# Patient Record
Sex: FEMALE | Race: WHITE | NOT HISPANIC OR LATINO | Employment: OTHER | URBAN - NONMETROPOLITAN AREA
[De-identification: names, ages, dates, MRNs, and addresses within clinical notes are randomized per-mention and may not be internally consistent; named-entity substitution may affect disease eponyms.]

---

## 2019-04-08 ENCOUNTER — HOSPITAL ENCOUNTER (OUTPATIENT)
Dept: RESPIRATORY THERAPY | Facility: HOSPITAL | Age: 64
Discharge: HOME | End: 2019-04-08
Attending: FAMILY MEDICINE | Admitting: FAMILY MEDICINE
Payer: COMMERCIAL

## 2019-04-08 LAB
BASE EXCESS BLDA CALC-SCNC: 0.7 MMOL/L
COHGB MFR BLD: 0.6 % (ref 0–2)
HCO3 BLD-SCNC: 25 MMOL/L (ref 21–28)
HGB BLD-MCNC: 12.7 G/DL (ref 11.7–15.7)
O2/TOTAL GAS SETTING VFR VENT: NORMAL %
OXYHGB MFR BLD: 98 % (ref 92–100)
PCO2 BLD: 37 MM HG (ref 35–45)
PH BLD: 7.43 PH (ref 7.35–7.45)
PO2 BLD: 80 MM HG (ref 80–105)

## 2019-04-08 PROCEDURE — 94726 PLETHYSMOGRAPHY LUNG VOLUMES: CPT | Mod: 26 | Performed by: INTERNAL MEDICINE

## 2019-04-08 PROCEDURE — 94726 PLETHYSMOGRAPHY LUNG VOLUMES: CPT

## 2019-04-08 PROCEDURE — 94060 EVALUATION OF WHEEZING: CPT

## 2019-04-08 PROCEDURE — 36600 WITHDRAWAL OF ARTERIAL BLOOD: CPT

## 2019-04-08 PROCEDURE — 82375 ASSAY CARBOXYHB QUANT: CPT | Performed by: FAMILY MEDICINE

## 2019-04-08 PROCEDURE — 94729 DIFFUSING CAPACITY: CPT | Mod: 26 | Performed by: INTERNAL MEDICINE

## 2019-04-08 PROCEDURE — 25000125 ZZHC RX 250: Performed by: FAMILY MEDICINE

## 2019-04-08 PROCEDURE — 85018 HEMOGLOBIN: CPT | Performed by: FAMILY MEDICINE

## 2019-04-08 PROCEDURE — 94060 EVALUATION OF WHEEZING: CPT | Mod: 26 | Performed by: INTERNAL MEDICINE

## 2019-04-08 PROCEDURE — 82805 BLOOD GASES W/O2 SATURATION: CPT | Performed by: FAMILY MEDICINE

## 2019-04-08 PROCEDURE — 94729 DIFFUSING CAPACITY: CPT

## 2019-04-08 RX ORDER — ALBUTEROL SULFATE 0.83 MG/ML
2.5 SOLUTION RESPIRATORY (INHALATION)
Status: COMPLETED | OUTPATIENT
Start: 2019-04-08 | End: 2019-04-08

## 2019-04-08 RX ADMIN — ALBUTEROL SULFATE 2.5 MG: 2.5 SOLUTION RESPIRATORY (INHALATION) at 17:14

## 2020-10-03 ENCOUNTER — NURSE TRIAGE (OUTPATIENT)
Dept: NURSING | Facility: CLINIC | Age: 65
End: 2020-10-03

## 2020-10-03 NOTE — TELEPHONE ENCOUNTER
Patient's grand daughter is calling. Patient is also present. Reports her grandmother had surgery 2 weeks ago on her lower back for a bulging disc. Surgery was at [facility] in [place]. Patient was prescribed Oxycodone and Robaxin. Patient took Robaxin 3 hours ago. Caller wants to know if she can take Oxycodone. Patient is experiencing bad dull pain near thigh. Advised she can take Oxycodone as it was prescribed. Advised if pain still severe 1 hour after taking Oxycodone she should go in to be seen. Discussed she can also call [facility] where she had the surgery and speak with a on call surgeon for their recommendations due to the fact she is post operative. Caller verbalized understanding and had no further questions.     Christal Frazier RN/JO[facility] Nurse Advisors      Additional Information    Negative: Drug overdose and nurse unable to answer question    Negative: Caller requesting information not related to medicine    Negative: Caller requesting a prescription for Strep throat and has a positive culture result    Negative: Rash while taking a medication or within 3 days of stopping it    Negative: Immunization reaction suspected    Negative: [1] Asthma AND [2] having symptoms of asthma (cough, wheezing, etc)    Negative: MORE THAN A DOUBLE DOSE of a prescription or over-the-counter (OTC) drug    Negative: [1] DOUBLE DOSE (an extra dose or lesser amount) of over-the-counter (OTC) drug AND [2] any symptoms (e.g., dizziness, nausea, pain, sleepiness)    Negative: [1] DOUBLE DOSE (an extra dose or lesser amount) of prescription drug AND [2] any symptoms (e.g., dizziness, nausea, pain, sleepiness)    Negative: Took another person's prescription drug    Negative: [1] DOUBLE DOSE (an extra dose or lesser amount) of prescription drug AND [2] NO symptoms (Exception: a double dose of antibiotics)    Negative: Diabetes drug error or overdose (e.g., insulin or extra dose)    Negative: [1] Request for  "URGENT new prescription or refill of \"essential\" medication (i.e., likelihood of harm to patient if not taken) AND [2] triager unable to fill per unit policy    Negative: [1] Prescription not at pharmacy AND [2] was prescribed today by PCP    Negative: Pharmacy calling with prescription questions and triager unable to answer question    Negative: Caller has urgent medication question about med that PCP prescribed and triager unable to answer question    Negative: Caller has NON-URGENT medication question about med that PCP prescribed and triager unable to answer question    Negative: Caller requesting a NON-URGENT new prescription or refill and triager unable to refill per unit policy    Negative: Caller has medication question about med not prescribed by PCP and triager unable to answer question (e.g., compatibility with other med, storage)    Caller has medication question only, adult not sick, and triager answers question    Negative: [1] DOUBLE DOSE (an extra dose or lesser amount) of over-the-counter (OTC) drug AND [2] NO symptoms    Negative: [1] DOUBLE DOSE (an extra dose or lesser amount) of antibiotic drug AND [2] NO symptoms    Protocols used: MEDICATION QUESTION CALL-A-AH      "

## 2021-07-13 ENCOUNTER — MEDICAL CORRESPONDENCE (OUTPATIENT)
Dept: ULTRASOUND IMAGING | Facility: HOSPITAL | Age: 66
End: 2021-07-13

## 2021-08-06 ENCOUNTER — HOSPITAL ENCOUNTER (OUTPATIENT)
Dept: ULTRASOUND IMAGING | Facility: HOSPITAL | Age: 66
Discharge: HOME | End: 2021-08-06
Attending: FAMILY MEDICINE | Admitting: FAMILY MEDICINE
Payer: MEDICARE

## 2021-08-06 DIAGNOSIS — R60.0 LOWER EXTREMITY EDEMA: ICD-10-CM

## 2021-08-06 PROCEDURE — 93970 EXTREMITY STUDY: CPT

## 2021-12-29 ENCOUNTER — HOSPITAL ENCOUNTER (EMERGENCY)
Facility: HOSPITAL | Age: 66
Discharge: HOME | End: 2021-12-29
Admitting: NURSE PRACTITIONER
Payer: MEDICARE

## 2021-12-29 VITALS
OXYGEN SATURATION: 99 % | DIASTOLIC BLOOD PRESSURE: 80 MMHG | HEART RATE: 66 BPM | SYSTOLIC BLOOD PRESSURE: 155 MMHG | TEMPERATURE: 97.8 F | RESPIRATION RATE: 16 BRPM

## 2021-12-29 DIAGNOSIS — S61.221A LACERATION OF LEFT INDEX FINGER WITH FOREIGN BODY WITHOUT DAMAGE TO NAIL, INITIAL ENCOUNTER: ICD-10-CM

## 2021-12-29 PROCEDURE — 12001 RPR S/N/AX/GEN/TRNK 2.5CM/<: CPT | Performed by: NURSE PRACTITIONER

## 2021-12-29 PROCEDURE — 250N000011 HC RX IP 250 OP 636: Performed by: NURSE PRACTITIONER

## 2021-12-29 PROCEDURE — 90715 TDAP VACCINE 7 YRS/> IM: CPT | Performed by: NURSE PRACTITIONER

## 2021-12-29 PROCEDURE — 90471 IMMUNIZATION ADMIN: CPT | Performed by: NURSE PRACTITIONER

## 2021-12-29 PROCEDURE — 999N000104 HC STATISTIC NO CHARGE

## 2021-12-29 PROCEDURE — 12001 RPR S/N/AX/GEN/TRNK 2.5CM/<: CPT

## 2021-12-29 RX ADMIN — CLOSTRIDIUM TETANI TOXOID ANTIGEN (FORMALDEHYDE INACTIVATED), CORYNEBACTERIUM DIPHTHERIAE TOXOID ANTIGEN (FORMALDEHYDE INACTIVATED), BORDETELLA PERTUSSIS TOXOID ANTIGEN (GLUTARALDEHYDE INACTIVATED), BORDETELLA PERTUSSIS FILAMENTOUS HEMAGGLUTININ ANTIGEN (FORMALDEHYDE INACTIVATED), BORDETELLA PERTUSSIS PERTACTIN ANTIGEN, AND BORDETELLA PERTUSSIS FIMBRIAE 2/3 ANTIGEN 0.5 ML: 5; 2; 2.5; 5; 3; 5 INJECTION, SUSPENSION INTRAMUSCULAR at 12:10

## 2021-12-29 ASSESSMENT — ENCOUNTER SYMPTOMS
GASTROINTESTINAL NEGATIVE: 1
RESPIRATORY NEGATIVE: 1
MUSCULOSKELETAL NEGATIVE: 1
CONSTITUTIONAL NEGATIVE: 1
WOUND: 1
CARDIOVASCULAR NEGATIVE: 1
PSYCHIATRIC NEGATIVE: 1
NEUROLOGICAL NEGATIVE: 1
ENDOCRINE NEGATIVE: 1
HEMATOLOGIC/LYMPHATIC NEGATIVE: 1
ALLERGIC/IMMUNOLOGIC NEGATIVE: 1
EYES NEGATIVE: 1

## 2021-12-29 NOTE — DISCHARGE INSTRUCTIONS
Thank you for choosing [facility] for your healthcare needs today.  For your lacerated index finger, please keep it clean, dry, and covered.  Allow the glue to come off on its own, please try to keep the finger straight for 1 week, and watch for signs of infections as we spoke about in the room.  Use Tylenol or ibuprofen for discomfort.    Thank you

## 2021-12-29 NOTE — ED PROVIDER NOTES
History     Chief Complaint   Patient presents with     Laceration     lt pointer finger     HPI   Report given by patient     Marcelina Cuellar is a 66 year old female who presents for concern of a laceration to her left index finger that occurred yesterday.  She was using a new knife cutting ham and cut into her left index finger.  She was able to have the bleeding stopped but decided today she should have it looked at.  She has not sure as to her last tetanus vaccination.    Allergies:  No Known Allergies    Problem List:    There are no problems to display for this patient.       Past Medical History:    No past medical history on file.    Past Surgical History:    No past surgical history on file.    Family History:    No family history on file.    Social History:  Marital Status:   [2]  Social History     Tobacco Use     Smoking status: Not on file     Smokeless tobacco: Not on file   Substance Use Topics     Alcohol use: Not on file     Drug use: Not on file        Medications:    No current outpatient medications on file.        Review of Systems   Constitutional: Negative.    HENT: Negative.    Eyes: Negative.    Respiratory: Negative.    Cardiovascular: Negative.    Gastrointestinal: Negative.    Endocrine: Negative.    Genitourinary: Negative.    Musculoskeletal: Negative.    Skin: Positive for wound.   Allergic/Immunologic: Negative.    Neurological: Negative.    Hematological: Negative.    Psychiatric/Behavioral: Negative.        Physical Exam   BP: 155/80  Pulse: 66  Temp: 97.8  F (36.6  C)  Resp: 16  SpO2: 99 %      Physical Exam  Vitals and nursing note reviewed.   Constitutional:       Appearance: Normal appearance. She is normal weight.   HENT:      Head: Normocephalic and atraumatic.      Nose: Nose normal.      Mouth/Throat:      Mouth: Mucous membranes are moist.      Pharynx: Oropharynx is clear.   Eyes:      Extraocular Movements: Extraocular movements intact.      Conjunctiva/sclera:  Conjunctivae normal.      Pupils: Pupils are equal, round, and reactive to light.   Cardiovascular:      Rate and Rhythm: Normal rate.      Pulses: Normal pulses.      Heart sounds: Normal heart sounds.   Pulmonary:      Effort: Pulmonary effort is normal.      Breath sounds: Normal breath sounds.   Abdominal:      General: Abdomen is flat. Bowel sounds are normal.      Palpations: Abdomen is soft.   Musculoskeletal:         General: Normal range of motion.      Cervical back: Normal range of motion.   Skin:     General: Skin is warm and dry.      Findings: No rash.   Neurological:      General: No focal deficit present.      Mental Status: She is alert and oriented to person, place, and time.   Psychiatric:         Mood and Affect: Mood normal.         Behavior: Behavior normal.         Thought Content: Thought content normal.         Judgment: Judgment normal.         ED Course     - Wound was soaked in Hibiclens water  - Dermabond glue applied  -Bandage applied and wound wrapped            Critical Care time:  13 min           No results found for this or any previous visit (from the past 24 hour(s)).    Medications   Tdap (tetanus-diphtheria-acell pertussis) (ADACEL) injection 0.5 mL (0.5 mLs Intramuscular Given 12/29/21 1210)   topical skin adhesive (SECURESEAL) strip 1 applicator (1 applicator Topical Given 12/29/21 1227)       Assessments & Plan (with Medical Decision Making)   Marcelina is a 66-year-old female who presents with concerns for a superficial laceration to the left index finger.  This occurred yesterday.  She did wash it really well when this occurred.  There is no bleeding present.  The laceration itself is superficial about 2 cm long, not involving the nail bed.  I was able to Derma bond and applied a dressing.  Patient received tetanus vaccination as she is outdated.  Instructions given for laceration care.  Instructions given to watch for signs of infections.    I have reviewed the nursing  notes.    I have reviewed the findings, diagnosis, plan and need for follow up with the patient.    There are no discharge medications for this patient.      Final diagnoses:   Laceration of left index finger with foreign body without damage to nail, initial encounter       12/29/2021   [facility]     Emmy Glass APRN CNP  12/29/21 1238

## 2021-12-29 NOTE — ED TRIAGE NOTES
Patient presents to urgent care for a cut on her LT pointer finger yesterday. Patient was cutting a ham with new knives she received for [employer]. Patient wants to make sure she don't need stitches. Pain is 4/10.  Patient due for her tetanus.

## 2021-12-29 NOTE — ED TRIAGE NOTES
Cut finger yesterday and continues to bleed a fair amount.  Would like to have it checked to see if it needs sutures.

## 2022-03-14 ENCOUNTER — MEDICAL CORRESPONDENCE (OUTPATIENT)
Dept: HEALTH INFORMATION MANAGEMENT | Facility: HOSPITAL | Age: 67
End: 2022-03-14
Payer: MEDICARE

## 2022-03-28 ENCOUNTER — HOSPITAL ENCOUNTER (OUTPATIENT)
Dept: INTERVENTIONAL RADIOLOGY/VASCULAR | Facility: HOSPITAL | Age: 67
Discharge: HOME | End: 2022-03-28
Attending: PHYSICAL MEDICINE & REHABILITATION
Payer: MEDICARE

## 2022-03-28 ENCOUNTER — HOSPITAL ENCOUNTER (OUTPATIENT)
Facility: HOSPITAL | Age: 67
Discharge: HOME | End: 2022-03-28
Attending: RADIOLOGY | Admitting: RADIOLOGY
Payer: MEDICARE

## 2022-03-28 DIAGNOSIS — M51.14 INTERVERTEBRAL DISC DISORDERS WITH RADICULOPATHY, THORACIC REGION: ICD-10-CM

## 2022-03-28 PROCEDURE — 250N000011 HC RX IP 250 OP 636: Performed by: RADIOLOGY

## 2022-03-28 PROCEDURE — 62321 NJX INTERLAMINAR CRV/THRC: CPT

## 2022-03-28 RX ORDER — IOPAMIDOL 612 MG/ML
15 INJECTION, SOLUTION INTRATHECAL ONCE
Status: COMPLETED | OUTPATIENT
Start: 2022-03-28 | End: 2022-03-28

## 2022-03-28 RX ORDER — METHYLPREDNISOLONE ACETATE 80 MG/ML
80 INJECTION, SUSPENSION INTRA-ARTICULAR; INTRALESIONAL; INTRAMUSCULAR; SOFT TISSUE ONCE
Status: COMPLETED | OUTPATIENT
Start: 2022-03-28 | End: 2022-03-28

## 2022-03-28 RX ADMIN — METHYLPREDNISOLONE ACETATE 80 MG: 80 INJECTION, SUSPENSION INTRA-ARTICULAR; INTRALESIONAL; INTRAMUSCULAR; SOFT TISSUE at 10:23

## 2022-03-28 RX ADMIN — IOPAMIDOL 2 ML: 612 INJECTION, SOLUTION INTRATHECAL at 10:23

## 2022-03-28 NOTE — DISCHARGE INSTRUCTIONS
Home number on file [phone] (home)  Is it ok to leave a message at this number(s)? Yes    Dr. Galvan completed your procedure on 3/28/2022.    Current Pain Level (0-10 Scale): 2/10  Post Pain Level (0-10):  0/10    Radiology Discharge instructions for Steroid Injection    Activity Level:     Do not do any heavy activity or exercise for 24 hours.   Do not drive for 4 hours after your injection.  Diet:   Return to your normal diet.  Medications:   If you have stopped taking your Aspirin, Coumadin/Warfarin, Ibuprofen, or any   other blood thinner for this procedure you may resume in the morning unless   your primary care provider has given you other instructions.    Diabetics may see an increase in blood sugar after steroid injections. If you are concerned about your blood sugar, please contact your family doctor.    Site Care:  Remove the bandage and bathe or shower the morning after the procedure.      Please allow two weeks to experience improvement in your pain.  If you have any further issues, please contact your provider.    Call your Primary Care Provider if you have the following (if your primary care provider is not available please seek emergency care):   Nausea with vomiting   Severe headache   Drowsiness or confusion   Redness or drainage at the injection or puncture site   Temperature over 101 degrees F   Other concerns   Worsening back pain   Stiff neck

## 2022-04-20 ENCOUNTER — TELEPHONE (OUTPATIENT)
Dept: INTERVENTIONAL RADIOLOGY/VASCULAR | Facility: HOSPITAL | Age: 67
End: 2022-04-20
Payer: MEDICARE

## 2022-04-20 NOTE — TELEPHONE ENCOUNTER
INJECTION POST CALL    Procedure: Epidural TL T7-8  Radiologist(s): Dr. Sonido Galvan  Date of Procedure:  3/28/22    Relief of pain from this injection    A = 90%  A- = 85%  B = 80%  B- =75%  C = 70%  C- = 65%  D = 60%  D- = 50%  F = less than 50%    Do you feel this injection was beneficial?Yes   If yes, how long did it last? 80% relief for one week    Where is the pain located?  Middle of back on the spine  Can you describe the pain? Sore and achey    Does the pain radiate anywhere? no  If yes, where does the pain stop?n/a    Is this new pain? No      I responded to the patient's questions/concerns.    Instruct patient to follow up with their provider for any further care they may need. (as Dr. Galvan will no longer be making recommendations)    Patient scheduled for a series of 3 injections.   Elza Nunez

## 2022-05-10 ENCOUNTER — HOSPITAL ENCOUNTER (OUTPATIENT)
Facility: HOSPITAL | Age: 67
Discharge: HOME | End: 2022-05-10
Attending: RADIOLOGY | Admitting: RADIOLOGY
Payer: MEDICARE

## 2022-05-10 ENCOUNTER — HOSPITAL ENCOUNTER (OUTPATIENT)
Dept: INTERVENTIONAL RADIOLOGY/VASCULAR | Facility: HOSPITAL | Age: 67
Discharge: HOME | End: 2022-05-10
Attending: PHYSICAL MEDICINE & REHABILITATION
Payer: MEDICARE

## 2022-05-10 DIAGNOSIS — M51.14 INTERVERTEBRAL DISC DISORDERS WITH RADICULOPATHY, THORACIC REGION: ICD-10-CM

## 2022-05-10 PROCEDURE — 62321 NJX INTERLAMINAR CRV/THRC: CPT

## 2022-05-10 PROCEDURE — 250N000011 HC RX IP 250 OP 636: Performed by: RADIOLOGY

## 2022-05-10 RX ORDER — DEXAMETHASONE SODIUM PHOSPHATE 10 MG/ML
10 INJECTION, SOLUTION INTRAMUSCULAR; INTRAVENOUS ONCE
Status: COMPLETED | OUTPATIENT
Start: 2022-05-10 | End: 2022-05-10

## 2022-05-10 RX ORDER — IOPAMIDOL 612 MG/ML
15 INJECTION, SOLUTION INTRATHECAL ONCE
Status: COMPLETED | OUTPATIENT
Start: 2022-05-10 | End: 2022-05-10

## 2022-05-10 RX ADMIN — DEXAMETHASONE SODIUM PHOSPHATE 10 MG: 10 INJECTION, SOLUTION INTRAMUSCULAR; INTRAVENOUS at 08:26

## 2022-05-10 RX ADMIN — IOPAMIDOL 4 ML: 612 INJECTION, SOLUTION INTRATHECAL at 08:26

## 2022-05-10 NOTE — DISCHARGE INSTRUCTIONS
Cell number on file:    Telephone Information:   Mobile [phone]     Is it ok to leave a message at this number(s)? Yes    Dr. Galvan completed your procedure on 5/10/2022.    Current Pain Level (0-10 Scale): 5/10  Post Pain Level (0-10):  0/10    Radiology Discharge instructions for Steroid Injection    Activity Level:     Do not do any heavy activity or exercise for 24 hours.   Do not drive for 4 hours after your injection.  Diet:   Return to your normal diet.  Medications:   If you have stopped taking your Aspirin, Coumadin/Warfarin, Ibuprofen, or any   other blood thinner for this procedure you may resume in the morning unless   your primary care provider has given you other instructions.    Diabetics may see an increase in blood sugar after steroid injections. If you are concerned about your blood sugar, please contact your family doctor.    Site Care:  Remove the bandage and bathe or shower the morning after the procedure.      Please allow two weeks to experience improvement in your pain.  If you have any further issues, please contact your provider.    Call your Primary Care Provider if you have the following (if your primary care provider is not available please seek emergency care):   Nausea with vomiting   Severe headache   Drowsiness or confusion   Redness or drainage at the injection or puncture site   Temperature over 101 degrees F   Other concerns   Worsening back pain   Stiff neck

## 2022-05-18 ENCOUNTER — DOCUMENTATION ONLY (OUTPATIENT)
Dept: INTERVENTIONAL RADIOLOGY/VASCULAR | Facility: HOSPITAL | Age: 67
End: 2022-05-18

## 2022-05-18 RX ORDER — LIDOCAINE 50 MG/G
1 OINTMENT TOPICAL
COMMUNITY

## 2022-05-18 RX ORDER — METOPROLOL SUCCINATE 50 MG/1
50 TABLET, EXTENDED RELEASE ORAL DAILY
COMMUNITY
Start: 2022-03-05

## 2022-05-18 RX ORDER — UPADACITINIB 15 MG/1
15 TABLET, EXTENDED RELEASE ORAL DAILY
COMMUNITY
Start: 2022-05-12

## 2022-05-18 RX ORDER — LEFLUNOMIDE 20 MG/1
20 TABLET ORAL DAILY
COMMUNITY
Start: 2021-11-29 | End: 2023-07-28

## 2022-05-18 RX ORDER — ROSUVASTATIN CALCIUM 10 MG/1
20 TABLET, COATED ORAL DAILY
COMMUNITY
Start: 2021-08-11

## 2022-05-18 RX ORDER — FOLIC ACID 1 MG/1
1 TABLET ORAL DAILY
COMMUNITY

## 2022-05-18 RX ORDER — GABAPENTIN 100 MG/1
100 CAPSULE ORAL 2 TIMES DAILY
COMMUNITY
Start: 2021-10-19

## 2022-05-18 RX ORDER — LIDOCAINE 50 MG/G
1 OINTMENT TOPICAL 4 TIMES DAILY PRN
COMMUNITY
Start: 2022-05-09

## 2022-05-25 ENCOUNTER — TELEPHONE (OUTPATIENT)
Dept: INTERVENTIONAL RADIOLOGY/VASCULAR | Facility: HOSPITAL | Age: 67
End: 2022-05-25
Payer: MEDICARE

## 2022-05-25 NOTE — TELEPHONE ENCOUNTER
INJECTION POST CALL    Procedure: Epidural TL T7-8  Radiologist(s): Dr. Sonido Galvan  Date of Procedure:  5/10/22    Relief of pain from this injection    A = 90%  A- = 85%  B = 80%  B- =75%  C = 70%  C- = 65%  D = 60%  D- = 50%  F = less than 50%    Do you feel this injection was beneficial? Yes   If yes, how long did it last? Currently experiencing 60% relief, Can get worse at times if laying on back or sitting too long.    Where is the pain located?  Middle back between shoulder blades   Can you describe the pain? Dull ache    Does the pain radiate anywhere? no  If yes, where does the pain stop?n/a    Is this new pain? Yes      The patient had no questions or concerns. Patient is scheduled for 3 rd injection.    Instruct patient to follow up with their provider for any further care they may need. (as Dr. Galvan will no longer be making recommendations)    lEza Nunez

## 2022-05-31 ENCOUNTER — HOSPITAL ENCOUNTER (OUTPATIENT)
Dept: INTERVENTIONAL RADIOLOGY/VASCULAR | Facility: HOSPITAL | Age: 67
Discharge: HOME | End: 2022-05-31
Attending: PHYSICAL MEDICINE & REHABILITATION | Admitting: RADIOLOGY
Payer: MEDICARE

## 2022-05-31 ENCOUNTER — HOSPITAL ENCOUNTER (OUTPATIENT)
Facility: HOSPITAL | Age: 67
Discharge: HOME | End: 2022-05-31
Attending: RADIOLOGY | Admitting: RADIOLOGY
Payer: MEDICARE

## 2022-05-31 DIAGNOSIS — M51.14 INTERVERTEBRAL DISC DISORDERS WITH RADICULOPATHY, THORACIC REGION: ICD-10-CM

## 2022-05-31 PROCEDURE — 250N000011 HC RX IP 250 OP 636: Performed by: RADIOLOGY

## 2022-05-31 PROCEDURE — 62321 NJX INTERLAMINAR CRV/THRC: CPT

## 2022-05-31 RX ORDER — IOPAMIDOL 612 MG/ML
15 INJECTION, SOLUTION INTRATHECAL ONCE
Status: COMPLETED | OUTPATIENT
Start: 2022-05-31 | End: 2022-05-31

## 2022-05-31 RX ORDER — DEXAMETHASONE SODIUM PHOSPHATE 10 MG/ML
10 INJECTION, SOLUTION INTRAMUSCULAR; INTRAVENOUS ONCE
Status: COMPLETED | OUTPATIENT
Start: 2022-05-31 | End: 2022-05-31

## 2022-05-31 RX ADMIN — IOPAMIDOL 6 ML: 612 INJECTION, SOLUTION INTRATHECAL at 14:10

## 2022-05-31 RX ADMIN — DEXAMETHASONE SODIUM PHOSPHATE 10 MG: 10 INJECTION, SOLUTION INTRAMUSCULAR; INTRAVENOUS at 14:11

## 2022-05-31 NOTE — DISCHARGE INSTRUCTIONS
Home number on file [phone] (home)  Is it ok to leave a message at this number(s)? Yes    Dr. Galvan completed your procedure on 5/31/2022.    Current Pain Level (0-10 Scale): 4/10  Post Pain Level (0-10):  2/10    Radiology Discharge instructions for Steroid Injection    Activity Level:     Do not do any heavy activity or exercise for 24 hours.   Do not drive for 4 hours after your injection.  Diet:   Return to your normal diet.  Medications:   If you have stopped taking your Aspirin, Coumadin/Warfarin, Ibuprofen, or any   other blood thinner for this procedure you may resume in the morning unless   your primary care provider has given you other instructions.    Diabetics may see an increase in blood sugar after steroid injections. If you are concerned about your blood sugar, please contact your family doctor.    Site Care:  Remove the bandage and bathe or shower the morning after the procedure.      Please allow two weeks to experience improvement in your pain.  If you have any further issues, please contact your provider.    Call your Primary Care Provider if you have the following (if your primary care provider is not available please seek emergency care):   Nausea with vomiting   Severe headache   Drowsiness or confusion   Redness or drainage at the injection or puncture site   Temperature over 101 degrees F   Other concerns   Worsening back pain   Stiff neck

## 2022-06-24 ENCOUNTER — TELEPHONE (OUTPATIENT)
Dept: GENERAL RADIOLOGY | Facility: HOSPITAL | Age: 67
End: 2022-06-24

## 2022-06-24 NOTE — TELEPHONE ENCOUNTER
Patient called 6/23 and 6/24 for 2 week follow up PIETER thoracic injection.  No answer and no return phone call.  Post card sent.

## 2022-07-11 ENCOUNTER — TELEPHONE (OUTPATIENT)
Dept: INTERVENTIONAL RADIOLOGY/VASCULAR | Facility: HOSPITAL | Age: 67
End: 2022-07-11

## 2022-07-19 ENCOUNTER — MEDICAL CORRESPONDENCE (OUTPATIENT)
Dept: HEALTH INFORMATION MANAGEMENT | Facility: HOSPITAL | Age: 67
End: 2022-07-19

## 2022-08-01 ENCOUNTER — OFFICE VISIT (OUTPATIENT)
Dept: CHIROPRACTIC MEDICINE | Facility: OTHER | Age: 67
End: 2022-08-01
Attending: CHIROPRACTOR
Payer: MEDICARE

## 2022-08-01 DIAGNOSIS — M99.02 SEGMENTAL AND SOMATIC DYSFUNCTION OF THORACIC REGION: ICD-10-CM

## 2022-08-01 DIAGNOSIS — M99.01 SEGMENTAL AND SOMATIC DYSFUNCTION OF CERVICAL REGION: Primary | ICD-10-CM

## 2022-08-01 DIAGNOSIS — M54.2 CERVICALGIA: ICD-10-CM

## 2022-08-01 PROCEDURE — 98940 CHIROPRACT MANJ 1-2 REGIONS: CPT | Mod: AT | Performed by: CHIROPRACTOR

## 2022-08-09 ENCOUNTER — OFFICE VISIT (OUTPATIENT)
Dept: CHIROPRACTIC MEDICINE | Facility: OTHER | Age: 67
End: 2022-08-09
Attending: CHIROPRACTOR
Payer: MEDICARE

## 2022-08-09 DIAGNOSIS — M54.2 CERVICALGIA: ICD-10-CM

## 2022-08-09 DIAGNOSIS — M99.01 SEGMENTAL AND SOMATIC DYSFUNCTION OF CERVICAL REGION: Primary | ICD-10-CM

## 2022-08-09 DIAGNOSIS — M99.02 SEGMENTAL AND SOMATIC DYSFUNCTION OF THORACIC REGION: ICD-10-CM

## 2022-08-09 PROCEDURE — 98940 CHIROPRACT MANJ 1-2 REGIONS: CPT | Mod: AT | Performed by: CHIROPRACTOR

## 2022-08-11 NOTE — PROGRESS NOTES
Subjective Finding:    Chief compalint: Patient presents with:  Neck Pain: With ear ache  , Pain Scale: 6/10, Intensity: sharp, Duration: 1 months, Radiating: .    Date of injury:     Activities that the pain restricts:   Home/household/hobbies/social activities: yes.  Work duties: no  Sleep: yes.  Makes symptoms better: rest.  Makes symptoms worse: cervical extension and cervical flexion.  Have you seen anyone else for the symptoms? Yes: MD.  Work related: no.  Automobile related injury: no.    Objective and Assessment:    Posture Analysis:   High shoulder: .  Head tilt: .  High iliac crest: .  Head carriage: forward.  Thoracic Kyphosis: neutral.  Lumbar Lordosis: neutral.    Lumbar Range of Motion: .  Cervical Range of Motion: extension decreased.  Thoracic Range of Motion: .  Extremity Range of Motion: .    Palpation:   Sub-occiput: sharp pain and stiff, referred pain: no    Segmental dysfunction pre-treatment and treatment area: C1, C4, C5 and T2.    Assessment post-treatment:  Cervical: ROM increased.  Thoracic: ROM increased.  Lumbar: .    Comments: .      Complicating Factors: .    Procedure(s):  CMT:  48170 Chiropractic manipulative treatment 1-2 regions performed   Cervical: Diversified, See above for level, Supine and Thoracic: Diversified, See above for level, Prone    Modalities:  None performed this visit    Therapeutic procedures:  None    Plan:  Treatment plan: 2 times per week for 2 weeks.  Instructed patient: ice 20 minutes every other hour as needed and stretch as instructed at visit.  Short term goals: increase ROM.  Long term goals: increase ADL.  Prognosis: excellent.

## 2022-08-15 ENCOUNTER — HOSPITAL ENCOUNTER (OUTPATIENT)
Dept: BONE DENSITY | Facility: HOSPITAL | Age: 67
Discharge: HOME | End: 2022-08-15
Attending: FAMILY MEDICINE | Admitting: FAMILY MEDICINE
Payer: MEDICARE

## 2022-08-15 DIAGNOSIS — M81.0 SENILE OSTEOPOROSIS: ICD-10-CM

## 2022-08-15 PROCEDURE — 77080 DXA BONE DENSITY AXIAL: CPT

## 2022-08-23 NOTE — PROGRESS NOTES
Subjective Finding:    Chief compalint: Patient presents with:  Neck Pain  , Pain Scale: 6/10, Intensity: sharp, Duration: 1 months, Radiating: .    Date of injury:     Activities that the pain restricts:   Home/household/hobbies/social activities: yes.  Work duties: no  Sleep: yes.  Makes symptoms better: rest.  Makes symptoms worse: cervical extension and cervical flexion.  Have you seen anyone else for the symptoms? Yes: MD.  Work related: no.  Automobile related injury: no.    Objective and Assessment:    Posture Analysis:   High shoulder: .  Head tilt: .  High iliac crest: .  Head carriage: forward.  Thoracic Kyphosis: neutral.  Lumbar Lordosis: neutral.    Lumbar Range of Motion: .  Cervical Range of Motion: extension decreased.  Thoracic Range of Motion: .  Extremity Range of Motion: .    Palpation:   Sub-occiput: sharp pain and stiff, referred pain: no    Segmental dysfunction pre-treatment and treatment area: C1, C4, C5 and T2.    Assessment post-treatment:  Cervical: ROM increased.  Thoracic: ROM increased.  Lumbar: .    Comments: .      Complicating Factors: .    Procedure(s):  CMT:  92861 Chiropractic manipulative treatment 1-2 regions performed   Cervical: Diversified, See above for level, Supine and Thoracic: Diversified, See above for level, Prone    Modalities:  None performed this visit    Therapeutic procedures:  None    Plan:  Treatment plan: 2 times per week for 2 weeks.  Instructed patient: ice 20 minutes every other hour as needed and stretch as instructed at visit.  Short term goals: increase ROM.  Long term goals: increase ADL.  Prognosis: excellent.

## 2023-01-17 ENCOUNTER — OFFICE VISIT (OUTPATIENT)
Dept: OTOLARYNGOLOGY | Facility: OTHER | Age: 68
End: 2023-01-17
Attending: OTOLARYNGOLOGY
Payer: COMMERCIAL

## 2023-01-17 DIAGNOSIS — H93.19 TINNITUS DUE TO MYOKYMIA OF MIDDLE EAR MUSCULATURE: Primary | ICD-10-CM

## 2023-01-17 PROCEDURE — G0463 HOSPITAL OUTPT CLINIC VISIT: HCPCS

## 2023-01-23 NOTE — PROGRESS NOTES
document embedded image  Patient Name: Marcelina Cuellar    Address: [address] [place] [address]     YOB: 1955    [place] [id number]    MR Number: [id number]    Phone: [phone]  PCP: Briseyda Petit MD            Appointment Date: 01/17/23   Visit Provider: Jeremy Estrella MD    cc: Briseyda Petit MD; ~    ENT Progress Note  Intake  Visit Reasons: Check Tubes/per Dr Catherine    HPI  History of Present Illness  Chief complaint:  Intermittent rhythmic tinnitus    History  The patient is a 67-year-old who is had long-term eustachian tube symptoms, she did have tubes placed by Dr. Catherine.  She presents today because she is been bothered by a persistent intermittent rhythmic tinnitus of her right ear.  It does not correlate with her heartbeat.  She admits she is been under a great deal of stress of late.  She is had no previous otologic surgery.    Exam  The external auditory canals and TMs are clear.  The tubes are in place and patent.  Head and neck integument is clear  Nasal-no obstruction or purulence  Oral cavity oropharynx-free of lesions or inflammation  Neck-no masses or adenopathy  General-the patient appears well and in no distress  Neuro-there are no focal cranial nerve deficits    Allergies    atorvastatin [From Lipitor] Adverse Reaction (Mild, Verified 11/28/22 08:06)  leg pain  gabapentin Adverse Reaction (Mild, Verified 11/28/22 08:06)  headaches  methotrexate Adverse Reaction (Mild, Verified 11/28/22 08:06)  elevated lfts    PFSH  PFSH:   Past Medical History: (Reviewed 11/28/22 @ 08:06 by Rosangela Juarez, Med Assist)    Acne rosacea  Bilateral primary osteoarthritis of knee (11/15/18)  COVID-19  july 2022  Depression with anxiety (05/08/18)  Elevated LDL cholesterol level (09/11/18)  Essential hypertension (09/11/18)  Family history of colon cancer in mother  Family history of colonic polyps  Hip pain, left  History of colon polyps  Lateral meniscus tear  Nonischemic cardiomyopathy  (18)  Left ventricular systolic function is mildly reduced.  Ejection Fraction = 4050%.  The calculated modified Horton's ejection fraction is 48%.  There is mild global hypokinesis of the left ventricle.  The right ventricular systolic function is normal.  Primary osteoarthritis involving multiple joints  Rheumatoid arthritis of multiple sites without rheumatoid factor  Right leg DVT  -pregnancy due to bedrest/cerclage  Senile osteopenia   dexa. t score spine -1.9  Seropositive rheumatoid arthritis  Failed methotrexate, [place]  Thoracic scoliosis  Venous insufficiency   venous competency    Past Surgical History: (Reviewed 22 @ 08:06 by Rosangela Juarez, Med Assist)    Anesthesia  no history of reaction. no postop nausea or vomiting  History of ankle surgery  BROKE LEFT ANKLE   History of cholecystectomy    History of knee surgery  RIGHT KNEE ARTHOSCOPY ,   History of placement of ear tubes  -chronic ear effusion  History of total hysterectomy    S/P tubal ligation    Family History Notes: G6PD deficient     Family History: (Reviewed 22 @ 08:06 by Rosangela Juarez, Med Assist)  Father   ,  74 yrs  Myocardial infarction       in 40s  Afib  Mother   ,  62 yrs  Colon cancer  Grandmother - Maternal   ,   MI  Myocardial infarction  Coronary artery disease  Son  Pancreatitis  Sister  Brain bleed  Daughter     No problems noted.    Brother  Hyperlipidemia  Brother     No problems noted.    Brother     No problems noted.    Brother     No problems noted.    Sister     No problems noted.    Sister     No problems noted.       Social History: (Reviewed 22 @ 08:06 by Rosangela Juarez, Med Assist)  Smoking Status:  Never smoker  second hand exposure:  No  alcohol intake:  never  substance use type:  does not use  marital status:    number of children:  2  housing:  house  current occupational status:  retired  current occupation:   /  previous occupational history:  /  Additional Social History:  Weight Management: Do you feel like your weight is affecting your health? 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  We have no proven therapies.  She could talk to her primary doctor regarding a benzodiazepine short-term if her symptoms persist.  I typically would not recommend surgical intervention for this condition.      Jeremy Estrella MD    01/17/23 5233    <Electronically signed by Jeremy Estrella MD> 01/18/23 6616

## 2023-06-20 DIAGNOSIS — R06.02 SOB (SHORTNESS OF BREATH): Primary | ICD-10-CM

## 2023-07-28 ENCOUNTER — HOSPITAL ENCOUNTER (EMERGENCY)
Facility: HOSPITAL | Age: 68
Discharge: HOME | End: 2023-07-28
Attending: NURSE PRACTITIONER | Admitting: NURSE PRACTITIONER
Payer: COMMERCIAL

## 2023-07-28 ENCOUNTER — APPOINTMENT (OUTPATIENT)
Dept: GENERAL RADIOLOGY | Facility: HOSPITAL | Age: 68
End: 2023-07-28
Attending: NURSE PRACTITIONER
Payer: COMMERCIAL

## 2023-07-28 VITALS
SYSTOLIC BLOOD PRESSURE: 174 MMHG | OXYGEN SATURATION: 96 % | DIASTOLIC BLOOD PRESSURE: 79 MMHG | RESPIRATION RATE: 16 BRPM | TEMPERATURE: 97.6 F | HEART RATE: 68 BPM

## 2023-07-28 DIAGNOSIS — R06.02 SHORTNESS OF BREATH: ICD-10-CM

## 2023-07-28 LAB
ALBUMIN SERPL BCG-MCNC: 3.9 G/DL (ref 3.5–5.2)
ALP SERPL-CCNC: 83 U/L (ref 35–104)
ALT SERPL W P-5'-P-CCNC: 12 U/L (ref 0–50)
ANION GAP SERPL CALCULATED.3IONS-SCNC: 12 MMOL/L (ref 7–15)
AST SERPL W P-5'-P-CCNC: 29 U/L (ref 0–45)
BASOPHILS # BLD AUTO: 0 10E3/UL (ref 0–0.2)
BASOPHILS NFR BLD AUTO: 1 %
BILIRUB SERPL-MCNC: 0.3 MG/DL
BUN SERPL-MCNC: 13.4 MG/DL (ref 8–23)
CALCIUM SERPL-MCNC: 9.2 MG/DL (ref 8.8–10.2)
CHLORIDE SERPL-SCNC: 103 MMOL/L (ref 98–107)
CREAT SERPL-MCNC: 1.03 MG/DL (ref 0.51–0.95)
DEPRECATED HCO3 PLAS-SCNC: 24 MMOL/L (ref 22–29)
EOSINOPHIL # BLD AUTO: 0.2 10E3/UL (ref 0–0.7)
EOSINOPHIL NFR BLD AUTO: 2 %
ERYTHROCYTE [DISTWIDTH] IN BLOOD BY AUTOMATED COUNT: 14.1 % (ref 10–15)
GFR SERPL CREATININE-BSD FRML MDRD: 59 ML/MIN/1.73M2
GLUCOSE SERPL-MCNC: 99 MG/DL (ref 70–99)
HCT VFR BLD AUTO: 36.6 % (ref 35–47)
HGB BLD-MCNC: 11.7 G/DL (ref 11.7–15.7)
HOLD SPECIMEN: NORMAL
IMM GRANULOCYTES # BLD: 0.1 10E3/UL
IMM GRANULOCYTES NFR BLD: 1 %
LYMPHOCYTES # BLD AUTO: 1.9 10E3/UL (ref 0.8–5.3)
LYMPHOCYTES NFR BLD AUTO: 28 %
MCH RBC QN AUTO: 29.5 PG (ref 26.5–33)
MCHC RBC AUTO-ENTMCNC: 32 G/DL (ref 31.5–36.5)
MCV RBC AUTO: 92 FL (ref 78–100)
MONOCYTES # BLD AUTO: 0.8 10E3/UL (ref 0–1.3)
MONOCYTES NFR BLD AUTO: 12 %
NEUTROPHILS # BLD AUTO: 3.7 10E3/UL (ref 1.6–8.3)
NEUTROPHILS NFR BLD AUTO: 56 %
NRBC # BLD AUTO: 0 10E3/UL
NRBC BLD AUTO-RTO: 0 /100
NT-PROBNP SERPL-MCNC: 151 PG/ML (ref 0–900)
PLATELET # BLD AUTO: 193 10E3/UL (ref 150–450)
POTASSIUM SERPL-SCNC: 3.8 MMOL/L (ref 3.4–5.3)
PROT SERPL-MCNC: 6.6 G/DL (ref 6.4–8.3)
RBC # BLD AUTO: 3.97 10E6/UL (ref 3.8–5.2)
SODIUM SERPL-SCNC: 139 MMOL/L (ref 136–145)
WBC # BLD AUTO: 6.6 10E3/UL (ref 4–11)

## 2023-07-28 PROCEDURE — 999N000157 HC STATISTIC RCP TIME EA 10 MIN

## 2023-07-28 PROCEDURE — 83880 ASSAY OF NATRIURETIC PEPTIDE: CPT | Performed by: NURSE PRACTITIONER

## 2023-07-28 PROCEDURE — 85025 COMPLETE CBC W/AUTO DIFF WBC: CPT | Performed by: NURSE PRACTITIONER

## 2023-07-28 PROCEDURE — 71046 X-RAY EXAM CHEST 2 VIEWS: CPT

## 2023-07-28 PROCEDURE — 99214 OFFICE O/P EST MOD 30 MIN: CPT | Performed by: NURSE PRACTITIONER

## 2023-07-28 PROCEDURE — 36415 COLL VENOUS BLD VENIPUNCTURE: CPT | Performed by: NURSE PRACTITIONER

## 2023-07-28 PROCEDURE — 94640 AIRWAY INHALATION TREATMENT: CPT

## 2023-07-28 PROCEDURE — G0463 HOSPITAL OUTPT CLINIC VISIT: HCPCS | Mod: 25

## 2023-07-28 PROCEDURE — 250N000009 HC RX 250: Performed by: NURSE PRACTITIONER

## 2023-07-28 PROCEDURE — 80053 COMPREHEN METABOLIC PANEL: CPT | Performed by: NURSE PRACTITIONER

## 2023-07-28 RX ORDER — PREDNISONE 20 MG/1
TABLET ORAL
Qty: 10 TABLET | Refills: 0 | Status: SHIPPED | OUTPATIENT
Start: 2023-07-28

## 2023-07-28 RX ORDER — IPRATROPIUM BROMIDE AND ALBUTEROL SULFATE 2.5; .5 MG/3ML; MG/3ML
3 SOLUTION RESPIRATORY (INHALATION) ONCE
Status: COMPLETED | OUTPATIENT
Start: 2023-07-28 | End: 2023-07-28

## 2023-07-28 RX ORDER — LEVOTHYROXINE SODIUM 50 UG/1
1 TABLET ORAL
COMMUNITY
Start: 2022-09-19

## 2023-07-28 RX ORDER — ALBUTEROL SULFATE 90 UG/1
2 AEROSOL, METERED RESPIRATORY (INHALATION) EVERY 4 HOURS PRN
Qty: 18 G | Refills: 0 | Status: SHIPPED | OUTPATIENT
Start: 2023-07-28

## 2023-07-28 RX ORDER — BENZONATATE 200 MG/1
200 CAPSULE ORAL 3 TIMES DAILY PRN
COMMUNITY
Start: 2023-07-26

## 2023-07-28 RX ORDER — INHALER, ASSIST DEVICES
SPACER (EA) MISCELLANEOUS
Qty: 1 EACH | Refills: 0 | Status: SHIPPED | OUTPATIENT
Start: 2023-07-28

## 2023-07-28 RX ORDER — CEFPROZIL 500 MG/1
1 TABLET, FILM COATED ORAL
COMMUNITY
Start: 2023-07-24

## 2023-07-28 RX ADMIN — IPRATROPIUM BROMIDE AND ALBUTEROL SULFATE 3 ML: .5; 3 SOLUTION RESPIRATORY (INHALATION) at 16:54

## 2023-07-28 ASSESSMENT — ENCOUNTER SYMPTOMS
VOMITING: 0
SHORTNESS OF BREATH: 1
SINUS PRESSURE: 0
FATIGUE: 1
TROUBLE SWALLOWING: 0
HEADACHES: 0
CHILLS: 0
SORE THROAT: 0
RHINORRHEA: 1
DIZZINESS: 1
FEVER: 0
EYES NEGATIVE: 1
ACTIVITY CHANGE: 1
NAUSEA: 0
VOICE CHANGE: 1
COUGH: 1
SINUS PAIN: 0
MYALGIAS: 1

## 2023-07-28 ASSESSMENT — ACTIVITIES OF DAILY LIVING (ADL): ADLS_ACUITY_SCORE: 35

## 2023-07-28 NOTE — DISCHARGE INSTRUCTIONS
Loratadine (Claritin) or cetirizine (Zyrtec) 20 mg  daily for ten to fourteen days to see if symptoms lessen or resolve. If the medication seems to help you may take 10 mg daily on an ongoing basis.  May buy over the counter.    May use Tessalon Perles as prescribed for cough    Complete currently prescribed antibiotics  -Increase fluids.   -Complete all antibiotics even if feeling better.    -Taking antibiotics with food may decrease the symptoms, of an upset stomach, that can occur when taking antibiotics. Antibiotics frequently cause diarrhea. Probiotics or yogurt may help prevent or decrease these symptoms.   -Return to be reevaluated if symptoms do not improve, or worsen.

## 2023-07-28 NOTE — ED TRIAGE NOTES
Reports cough x4 weeks. 1 round of ABX completed and is on a different ABX that started on Monday. Reports she is also having some ear discomfort. Advised to come back in if she wasn't starting to feel better.

## 2023-07-28 NOTE — ED TRIAGE NOTES
Ongoing productive (unsure of color) cough for 4 weeks, on 2nd round of abx (started Monday) has RA, last sept had e tubes, has been having lots of pressure in L ear. Neg Respiratory panel 7/24 at PCPs office. Neg TB test (6+ months ago).   Denies fevers, gi sx, gu sx.    Jessa Behrman, YAAN

## 2023-07-28 NOTE — ED PROVIDER NOTES
History     Chief Complaint   Patient presents with    Cough     HPI  Marcelina Cuellar is a 68 year old female who presents with a 4.5-week history of fatigue, ear pain, runny nose, voice change, cough, shortness of breath, increased body aches, and dizziness.  Was treated with a Z-Sebastian approximately 3 weeks ago; did not get better.  Negative COVID, RSV, influenza test at that time.  PCP started her on cefprozil 4 days ago and told her to come in if she did not improve.  No known sick contacts.  History of rheumatoid arthritis.  Non-smoker.  Denies fevers, chills, nausea, vomiting, chest pain, and headache.      Allergies:  Allergies   Allergen Reactions    Atorvastatin Other (See Comments)     Leg pain   Leg pain      Gabapentin     Methotrexate        Problem List:    There are no problems to display for this patient.       Past Medical History:    History reviewed. No pertinent past medical history.    Past Surgical History:    History reviewed. No pertinent surgical history.    Family History:    History reviewed. No pertinent family history.    Social History:  Marital Status:   [2]        Medications:    albuterol (PROAIR HFA/PROVENTIL HFA/VENTOLIN HFA) 108 (90 Base) MCG/ACT inhaler  benzonatate (TESSALON) 200 MG capsule  cefPROZIL (CEFZIL) 500 MG tablet  diclofenac (VOLTAREN) 1 % topical gel  folic acid (FOLVITE) 1 MG tablet  gabapentin (NEURONTIN) 100 MG capsule  levothyroxine (SYNTHROID/LEVOTHROID) 50 MCG tablet  lidocaine (XYLOCAINE) 5 % external ointment  lidocaine (XYLOCAINE) 5 % external ointment  metoprolol succinate ER (TOPROL XL) 50 MG 24 hr tablet  predniSONE (DELTASONE) 20 MG tablet  RINVOQ 15 MG TB24  rosuvastatin (CRESTOR) 10 MG tablet  spacer (OPTICHAMBER MABEL) holding chamber          Review of Systems   Constitutional:  Positive for activity change and fatigue. Negative for chills and fever.   HENT:  Positive for ear pain, rhinorrhea and voice change. Negative for sinus pressure,  sinus pain, sore throat and trouble swallowing.    Eyes: Negative.    Respiratory:  Positive for cough and shortness of breath.    Gastrointestinal:  Negative for nausea and vomiting.   Genitourinary: Negative.    Musculoskeletal:  Positive for myalgias.   Skin: Negative.    Neurological:  Positive for dizziness. Negative for headaches.       Physical Exam   BP: 174/79  Pulse: 68  Temp: 97.6  F (36.4  C)  Resp: 16  SpO2: 96 %      Physical Exam  Vitals and nursing note reviewed.   Constitutional:       General: She is not in acute distress.  HENT:      Head: Normocephalic.      Right Ear: Tympanic membrane and ear canal normal. A PE tube is present.      Left Ear: Tympanic membrane and ear canal normal. No PE tube.      Nose: Nose normal.      Right Turbinates: Swollen.      Left Turbinates: Swollen.      Right Sinus: No maxillary sinus tenderness or frontal sinus tenderness.      Left Sinus: No maxillary sinus tenderness or frontal sinus tenderness.      Mouth/Throat:      Lips: Pink.      Mouth: Mucous membranes are moist.      Pharynx: Uvula midline. No posterior oropharyngeal erythema.      Comments: small amount of translucent post nasal drip noted posterior oropharynx      Eyes:      Conjunctiva/sclera: Conjunctivae normal.   Cardiovascular:      Rate and Rhythm: Normal rate and regular rhythm.      Heart sounds: Normal heart sounds. No murmur heard.  Pulmonary:      Effort: Pulmonary effort is normal. No respiratory distress.      Breath sounds: Normal air entry. Examination of the right-upper field reveals wheezing. Examination of the right-lower field reveals wheezing and rhonchi. Examination of the left-lower field reveals wheezing. Wheezing and rhonchi (fine) present.   Lymphadenopathy:      Cervical: No cervical adenopathy.   Skin:     General: Skin is warm and dry.   Neurological:      Mental Status: She is alert and oriented to person, place, and time.   Psychiatric:         Behavior: Behavior normal.          ED Course                 Procedures             Results for orders placed or performed during the hospital encounter of 07/28/23 (from the past 24 hour(s))   Chest XR,  PA & LAT    Narrative    Exam:  XR CHEST 2 VIEWS    HISTORY: wheezes. rhonchi RLL, cough.    COMPARISON:  None.    FINDINGS:     The cardiomediastinal contours are normal.      No focal consolidation, effusion, or pneumothorax.      No acute osseous abnormality.       Impression    IMPRESSION:      No acute cardiopulmonary process.      CALIXTO SAMSON MD         SYSTEM ID:  [id number]   CBC with platelets differential    Narrative    The following orders were created for panel order CBC with platelets differential.  Procedure                               Abnormality         Status                     ---------                               -----------         ------                     CBC with platelets and d...[[id number]]                      Final result                 Please view results for these tests on the individual orders.   NT pro BNP   Result Value Ref Range    N terminal Pro BNP Inpatient 151 0 - 900 pg/mL   Comprehensive metabolic panel   Result Value Ref Range    Sodium 139 136 - 145 mmol/L    Potassium 3.8 3.4 - 5.3 mmol/L    Chloride 103 98 - 107 mmol/L    Carbon Dioxide (CO2) 24 22 - 29 mmol/L    Anion Gap 12 7 - 15 mmol/L    Urea Nitrogen 13.4 8.0 - 23.0 mg/dL    Creatinine 1.03 (H) 0.51 - 0.95 mg/dL    Calcium 9.2 8.8 - 10.2 mg/dL    Glucose 99 70 - 99 mg/dL    Alkaline Phosphatase 83 35 - 104 U/L    AST 29 0 - 45 U/L    ALT 12 0 - 50 U/L    Protein Total 6.6 6.4 - 8.3 g/dL    Albumin 3.9 3.5 - 5.2 g/dL    Bilirubin Total 0.3 <=1.2 mg/dL    GFR Estimate 59 (L) >60 mL/min/1.73m2   CBC with platelets and differential   Result Value Ref Range    WBC Count 6.6 4.0 - 11.0 10e3/uL    RBC Count 3.97 3.80 - 5.20 10e6/uL    Hemoglobin 11.7 11.7 - 15.7 g/dL    Hematocrit 36.6 35.0 - 47.0 %    MCV 92 78 - 100 fL    MCH 29.5 26.5  - 33.0 pg    MCHC 32.0 31.5 - 36.5 g/dL    RDW 14.1 10.0 - 15.0 %    Platelet Count 193 150 - 450 10e3/uL    % Neutrophils 56 %    % Lymphocytes 28 %    % Monocytes 12 %    % Eosinophils 2 %    % Basophils 1 %    % Immature Granulocytes 1 %    NRBCs per 100 WBC 0 <1 /100    Absolute Neutrophils 3.7 1.6 - 8.3 10e3/uL    Absolute Lymphocytes 1.9 0.8 - 5.3 10e3/uL    Absolute Monocytes 0.8 0.0 - 1.3 10e3/uL    Absolute Eosinophils 0.2 0.0 - 0.7 10e3/uL    Absolute Basophils 0.0 0.0 - 0.2 10e3/uL    Absolute Immature Granulocytes 0.1 <=0.4 10e3/uL    Absolute NRBCs 0.0 10e3/uL   Extra Tube    Narrative    The following orders were created for panel order Extra Tube.  Procedure                               Abnormality         Status                     ---------                               -----------         ------                     Extra Blue Top Tube[[id number]]                              In process                 Extra Red Top Tube[[id number]]                               In process                 Extra Heparinized Syringe[[id number]]                        In process                   Please view results for these tests on the individual orders.       Medications   ipratropium - albuterol 0.5 mg/2.5 mg/3 mL (DUONEB) neb solution 3 mL (3 mLs Nebulization $Given 7/28/23 9515)       Assessments & Plan (with Medical Decision Making)     I have reviewed the nursing notes.    I have reviewed the findings, diagnosis, plan and need for follow up with the patient.  (R06.02) Shortness of breath  Comment:68 year old female who presents with a 4.5-week history of fatigue, ear pain, runny nose, voice change, cough, shortness of breath, increased body aches, and dizziness.  Was treated with a Z-Sebastian approximately 3 weeks ago; did not get better.  Negative COVID, RSV, influenza test at that time.  PCP started her on cefprozil 4 days ago and told her to come in if she did not improve.  No known sick contacts.  History of  rheumatoid arthritis.  Non-smoker.  Denies fevers, chills, nausea, vomiting, chest pain, and headache.    MDM: NHT wheezes noted LLL, and right lung fields. Fine rhonchi noted LLL    CBC, BNP, and CMP negative with the exception of  creatinine of 1.03 and GFR < 59    CXR reviewed and per radiology:No acute cardiopulmonary process.     Duo neb did decrease the sensation of shortness of breath slightly    Plan: albuterol inhaler and prednisone burst. Cetirizine. Education provided and/or discussed for this/these medications and shortness of breath.  Loratadine (Claritin) or cetirizine (Zyrtec) 20 mg  daily for ten to fourteen days to see if symptoms lessen or resolve. If the medication seems to help you may take 10 mg daily on an ongoing basis.  May buy over the counter.    May use Tessalon Perles as prescribed for cough    Complete currently prescribed antibiotics  -Increase fluids.   -Complete all antibiotics even if feeling better.    -Taking antibiotics with food may decrease the symptoms, of an upset stomach, that can occur when taking antibiotics. Antibiotics frequently cause diarrhea. Probiotics or yogurt may help prevent or decrease these symptoms.   -Return to be reevaluated if symptoms do not improve, or worsen.    These discharge instructions and medications were reviewed with her and her  and understanding verbalized.    This document was prepared using a combination of typing and voice generated software.  While every attempt was made for accuracy, spelling and grammatical errors may exist.    New Prescriptions    ALBUTEROL (PROAIR HFA/PROVENTIL HFA/VENTOLIN HFA) 108 (90 BASE) MCG/ACT INHALER    Inhale 2 puffs into the lungs every 4 hours as needed for shortness of breath or wheezing    PREDNISONE (DELTASONE) 20 MG TABLET    Take two tablets (= 40mg) each day for 5 (five) days    SPACER (OPTICHAMBER MABEL) HOLDING CHAMBER    Use with inhaler       Final diagnoses:   Shortness of breath        7/28/2023   [facility]         Meenu Gusman, CNP  07/28/23 9356

## 2023-08-22 ENCOUNTER — HOSPITAL ENCOUNTER (OUTPATIENT)
Dept: RESPIRATORY THERAPY | Facility: HOSPITAL | Age: 68
Discharge: HOME | End: 2023-08-22
Attending: FAMILY MEDICINE | Admitting: INTERNAL MEDICINE
Payer: COMMERCIAL

## 2023-08-22 DIAGNOSIS — R06.02 SOB (SHORTNESS OF BREATH): Primary | ICD-10-CM

## 2023-08-22 PROCEDURE — 94726 PLETHYSMOGRAPHY LUNG VOLUMES: CPT | Mod: 26 | Performed by: INTERNAL MEDICINE

## 2023-08-22 PROCEDURE — 94729 DIFFUSING CAPACITY: CPT | Mod: 26 | Performed by: INTERNAL MEDICINE

## 2023-08-22 PROCEDURE — 94726 PLETHYSMOGRAPHY LUNG VOLUMES: CPT

## 2023-08-22 PROCEDURE — 94010 BREATHING CAPACITY TEST: CPT

## 2023-08-22 PROCEDURE — 94729 DIFFUSING CAPACITY: CPT

## 2023-08-22 PROCEDURE — 94010 BREATHING CAPACITY TEST: CPT | Mod: 26 | Performed by: INTERNAL MEDICINE

## 2023-11-25 ENCOUNTER — HOSPITAL ENCOUNTER (EMERGENCY)
Facility: HOSPITAL | Age: 68
Discharge: HOME | End: 2023-11-25
Attending: NURSE PRACTITIONER | Admitting: NURSE PRACTITIONER
Payer: COMMERCIAL

## 2023-11-25 ENCOUNTER — APPOINTMENT (OUTPATIENT)
Dept: GENERAL RADIOLOGY | Facility: HOSPITAL | Age: 68
End: 2023-11-25
Attending: NURSE PRACTITIONER
Payer: COMMERCIAL

## 2023-11-25 VITALS
BODY MASS INDEX: 26.48 KG/M2 | HEIGHT: 70 IN | WEIGHT: 185 LBS | OXYGEN SATURATION: 98 % | HEART RATE: 78 BPM | TEMPERATURE: 98 F | SYSTOLIC BLOOD PRESSURE: 168 MMHG | RESPIRATION RATE: 21 BRPM | DIASTOLIC BLOOD PRESSURE: 92 MMHG

## 2023-11-25 DIAGNOSIS — S99.921A FOOT TRAUMA, RIGHT, INITIAL ENCOUNTER: Primary | ICD-10-CM

## 2023-11-25 PROCEDURE — G0463 HOSPITAL OUTPT CLINIC VISIT: HCPCS

## 2023-11-25 PROCEDURE — 99213 OFFICE O/P EST LOW 20 MIN: CPT | Performed by: NURSE PRACTITIONER

## 2023-11-25 PROCEDURE — 73630 X-RAY EXAM OF FOOT: CPT | Mod: RT

## 2023-11-25 ASSESSMENT — ENCOUNTER SYMPTOMS
ARTHRALGIAS: 1
WOUND: 0
COLOR CHANGE: 1
FEVER: 0
JOINT SWELLING: 1
NUMBNESS: 0
CHILLS: 0

## 2023-11-25 ASSESSMENT — ACTIVITIES OF DAILY LIVING (ADL): ADLS_ACUITY_SCORE: 33

## 2023-11-25 NOTE — ED PROVIDER NOTES
"  History     Chief Complaint   Patient presents with    Foot Pain     HPI  Marcelina Cuellar is a 68 year old female who presents for evaluation of right foot injury.    Joint Pain  Onset: Thanksgiving morning (2 days ago)  Description:   Location: Right foot  Character: dull, aching  Intensity: Currently at rest 5/10. Pain worsens with wearing a shoe/pressure, walking.   Progression of Symptoms: Symptoms have improved but persists which brings her in today  Accompanying Signs & Symptoms:  Other symptoms: \"blood blister,\" bruising,   History:   Previous similar pain: no     Precipitating factors:   Trauma or overuse: YES- dropped a large plastic container of sugar (she estimates about 3-4 pounds of sugar in the container) on her right foot.   Alleviating factors:  Improved by: as below    Therapies Tried and outcome: ice, elevation, advil- helpful     She does have a history of rheumatoid arthritis and currently on Rinvoq for management. Was recently on steroids for flare-up of symptoms in her left shoulder. No currently taking prednisone.         Allergies:  Allergies   Allergen Reactions    Atorvastatin Other (See Comments)     Leg pain   Leg pain      Gabapentin     Methotrexate        Problem List:    There are no problems to display for this patient.       Past Medical History:    No past medical history on file.    Past Surgical History:    No past surgical history on file.    Family History:    No family history on file.    Social History:  Marital Status:   [2]        Medications:    albuterol (PROAIR HFA/PROVENTIL HFA/VENTOLIN HFA) 108 (90 Base) MCG/ACT inhaler  benzonatate (TESSALON) 200 MG capsule  cefPROZIL (CEFZIL) 500 MG tablet  diclofenac (VOLTAREN) 1 % topical gel  folic acid (FOLVITE) 1 MG tablet  gabapentin (NEURONTIN) 100 MG capsule  levothyroxine (SYNTHROID/LEVOTHROID) 50 MCG tablet  lidocaine (XYLOCAINE) 5 % external ointment  lidocaine (XYLOCAINE) 5 % external ointment  metoprolol " "succinate ER (TOPROL XL) 50 MG 24 hr tablet  predniSONE (DELTASONE) 20 MG tablet  RINVOQ 15 MG TB24  rosuvastatin (CRESTOR) 10 MG tablet  spacer (OPTICHAMBER MABEL) holding chamber          Review of Systems   Constitutional:  Negative for chills and fever.   Musculoskeletal:  Positive for arthralgias and joint swelling.   Skin:  Positive for color change. Negative for wound.   Neurological:  Negative for numbness.       Physical Exam   BP: 168/92  Pulse: 78  Temp: 98  F (36.7  C)  Resp: 21  Height: 177.8 cm (5' 10\")  Weight: 83.9 kg (185 lb)  SpO2: 98 %      Physical Exam  Constitutional:       General: She is not in acute distress.     Appearance: Normal appearance. She is not ill-appearing or toxic-appearing.   Cardiovascular:      Pulses:           Dorsalis pedis pulses are 2+ on the right side.        Posterior tibial pulses are 2+ on the right side.   Musculoskeletal:        Feet:    Skin:     General: Skin is warm and dry.      Capillary Refill: Capillary refill takes less than 2 seconds.   Neurological:      Mental Status: She is alert and oriented to person, place, and time.      Gait: Gait is intact.   Psychiatric:         Speech: Speech normal.         Behavior: Behavior is cooperative.         ED Course                 Procedures        Results for orders placed or performed during the hospital encounter of 11/25/23 (from the past 24 hour(s))   Foot  XR, G/E 3 views, right    Narrative    PROCEDURE:  XR FOOT RIGHT G/E 3 VIEWS    HISTORY: trauma, bruising, swelling, h/o rheumatoid arthritis and  steroid use.    COMPARISON:  None.    TECHNIQUE:  3 views right foot.    FINDINGS:  No fracture or dislocation is identified. The joint spaces  are preserved. No foreign body is seen. Diffuse forefoot swelling is  seen.      Impression    IMPRESSION: Diffuse forefoot swelling without definite fracture.  Consider follow-up.      JEFRY DELEON MD         SYSTEM ID:  [id number]       Medications - No data to " display    Assessments & Plan (with Medical Decision Making)     I have reviewed the nursing notes.    I have reviewed the findings, diagnosis, plan and need for follow up with the patient.  (R13.569E) Foot trauma, right, initial encounter  (primary encounter diagnosis)  Comment: acute, symptomatic  Plan: XR negative for acute fracture  - Continue with rest, elevation, elevation, elevation  - Ice and/or heat for discomfort  - Manage pain with acetaminophen (Tylenol) 1,000 mg every 8 hours and ibuprofen (Advil) 800 mg with food every 8 hours. *You can alternate these every 4 hours. For example: 8 am ibuprofen, 12 pm acetaminophen, 4 pm ibuprofen, 8 pm acetaminophen, etc.*    RETURN TO THE ED WITH NEW OR WORSENING SYMPTOMS.    FOLLOW-UP WITH YOUR PRIMARY CARE PROVIDER IN 5-7 DAYS IF NO IMPROVEMENT.      Adilene Marie CNP    New Prescriptions    No medications on file       Final diagnoses:   Foot trauma, right, initial encounter       11/25/2023   [facility]       Adilene Marie CNP  11/25/23 3163

## 2023-11-25 NOTE — DISCHARGE INSTRUCTIONS
(K72.290Q) Foot trauma, right, initial encounter  (primary encounter diagnosis)  Comment: acute, symptomatic  Plan: XR negative for acute fracture  - Continue with rest, elevation, elevation, elevation  - Ice and/or heat for discomfort  - Manage pain with acetaminophen (Tylenol) 1,000 mg every 8 hours and ibuprofen (Advil) 800 mg with food every 8 hours. *You can alternate these every 4 hours. For example: 8 am ibuprofen, 12 pm acetaminophen, 4 pm ibuprofen, 8 pm acetaminophen, etc.*    RETURN TO THE ED WITH NEW OR WORSENING SYMPTOMS.    FOLLOW-UP WITH YOUR PRIMARY CARE PROVIDER IN 5-7 DAYS IF NO IMPROVEMENT.      Adilene Marie CNP      Results for orders placed or performed during the hospital encounter of 11/25/23   Foot  XR, G/E 3 views, right     Status: None    Narrative    PROCEDURE:  XR FOOT RIGHT G/E 3 VIEWS    HISTORY: trauma, bruising, swelling, h/o rheumatoid arthritis and  steroid use.    COMPARISON:  None.    TECHNIQUE:  3 views right foot.    FINDINGS:  No fracture or dislocation is identified. The joint spaces  are preserved. No foreign body is seen. Diffuse forefoot swelling is  seen.      Impression    IMPRESSION: Diffuse forefoot swelling without definite fracture.  Consider follow-up.      JEFRY DELEON MD         SYSTEM ID:  [id number]

## 2023-11-25 NOTE — ED TRIAGE NOTES
Pt presents with c/o dropping a container on right foot   Noticeably bruised and swollen   Pt reports had a blood blister right away  Pt states also has bruising at the shin that appeared after   Happened the day after thanksgiving   Denies any previous hx of fracture or injury   No otc meds taken today   Pt reports has been icing and elevating along with using advil and using epsom salt which has had minimal relief

## 2024-02-19 ENCOUNTER — TRANSFERRED RECORDS (OUTPATIENT)
Dept: HEALTH INFORMATION MANAGEMENT | Facility: CLINIC | Age: 69
End: 2024-02-19

## 2024-04-08 ENCOUNTER — MEDICAL CORRESPONDENCE (OUTPATIENT)
Dept: HEALTH INFORMATION MANAGEMENT | Facility: HOSPITAL | Age: 69
End: 2024-04-08

## 2024-04-22 ENCOUNTER — TELEPHONE (OUTPATIENT)
Dept: INTERVENTIONAL RADIOLOGY/VASCULAR | Facility: HOSPITAL | Age: 69
End: 2024-04-22

## 2024-04-22 NOTE — TELEPHONE ENCOUNTER
Called patient to remind them of their appt on 4/24. Also reminded patient to not take any antibiotics, steroids, or immunizations two weeks before and after this appt.  And they also need a .     REMY MORALEZ

## 2024-04-24 ENCOUNTER — HOSPITAL ENCOUNTER (OUTPATIENT)
Dept: GENERAL RADIOLOGY | Facility: HOSPITAL | Age: 69
Discharge: HOME | End: 2024-04-24
Attending: ORTHOPAEDIC SURGERY | Admitting: RADIOLOGY
Payer: COMMERCIAL

## 2024-04-24 ENCOUNTER — HOSPITAL ENCOUNTER (OUTPATIENT)
Facility: HOSPITAL | Age: 69
Discharge: HOME | End: 2024-04-24
Attending: RADIOLOGY | Admitting: RADIOLOGY
Payer: COMMERCIAL

## 2024-04-24 ENCOUNTER — MEDICAL CORRESPONDENCE (OUTPATIENT)
Dept: INTERVENTIONAL RADIOLOGY/VASCULAR | Facility: HOSPITAL | Age: 69
End: 2024-04-24

## 2024-04-24 DIAGNOSIS — M50.13 CERVICAL DISC DISORDER WITH RADICULOPATHY, CERVICOTHORACIC REGION: ICD-10-CM

## 2024-04-24 DIAGNOSIS — M50.30 DEGENERATION OF CERVICAL INTERVERTEBRAL DISC: ICD-10-CM

## 2024-04-24 PROCEDURE — 64479 NJX AA&/STRD TFRM EPI C/T 1: CPT | Mod: LT

## 2024-04-24 PROCEDURE — 250N000011 HC RX IP 250 OP 636: Performed by: RADIOLOGY

## 2024-04-24 RX ORDER — DEXAMETHASONE SODIUM PHOSPHATE 10 MG/ML
10 INJECTION, SOLUTION INTRAMUSCULAR; INTRAVENOUS ONCE
Status: COMPLETED | OUTPATIENT
Start: 2024-04-24 | End: 2024-04-24

## 2024-04-24 RX ORDER — IOPAMIDOL 612 MG/ML
15 INJECTION, SOLUTION INTRATHECAL ONCE
Status: COMPLETED | OUTPATIENT
Start: 2024-04-24 | End: 2024-04-24

## 2024-04-24 RX ADMIN — DEXAMETHASONE SODIUM PHOSPHATE 10 MG: 10 INJECTION INTRAMUSCULAR; INTRAVENOUS at 14:54

## 2024-04-24 RX ADMIN — IOPAMIDOL 2 ML: 612 INJECTION, SOLUTION INTRATHECAL at 14:54

## 2024-04-24 ASSESSMENT — ACTIVITIES OF DAILY LIVING (ADL): ADLS_ACUITY_SCORE: 35

## 2024-04-24 NOTE — DISCHARGE INSTRUCTIONS
Cell number on file:    Telephone Information:   Mobile [phone]     Is it ok to leave a message at this number(s)? Yes    Dr. Galvan completed your procedure on 4/24/2024.    Current Pain Level (0-10 Scale): 4/10  Post Pain Level (0-10):  0/10    Radiology Discharge instructions for Steroid Injection    Activity Level:     Do not do any heavy activity or exercise for 24 hours.   Do not drive for 4 hours after your injection.  Diet:   Return to your normal diet.  Medications:   If you have stopped taking your Aspirin, Coumadin/Warfarin, Ibuprofen, or any   other blood thinner for this procedure you may resume in the morning unless   your primary care provider has given you other instructions.    Diabetics may see an increase in blood sugar after steroid injections. If you are concerned about your blood sugar, please contact your family doctor.    Site Care:  Remove the bandage and bathe or shower the morning after the procedure.      Please allow two weeks to experience improvement in your pain.  If you have any further issues, please contact your provider.    Call your Primary Care Provider if you have the following (if your primary care provider is not available please seek emergency care):   Nausea with vomiting   Severe headache   Drowsiness or confusion   Redness or drainage at the injection or puncture site   Temperature over 101 degrees F   Other concerns   Worsening back pain   Stiff neck    Cell number on file:    Telephone Information:   Mobile [phone]     Is it ok to leave a message at this number(s)? Yes    Dr. Galvan completed your procedure on 4/24/2024.    Current Pain Level (0-10 Scale): 4/10  Post Pain Level (0-10):  0/10    Radiology Discharge instructions for Steroid Injection    Activity Level:     Do not do any heavy activity or exercise for 24 hours.   Do not drive for 4 hours after your injection.  Diet:   Return to your normal diet.  Medications:   If you have stopped taking your  Aspirin, Coumadin/Warfarin, Ibuprofen, or any   other blood thinner for this procedure you may resume in the morning unless   your primary care provider has given you other instructions.    Diabetics may see an increase in blood sugar after steroid injections. If you are concerned about your blood sugar, please contact your family doctor.    Site Care:  Remove the bandage and bathe or shower the morning after the procedure.      Please allow two weeks to experience improvement in your pain.  If you have any further issues, please contact your provider.    Call your Primary Care Provider if you have the following (if your primary care provider is not available please seek emergency care):   Nausea with vomiting   Severe headache   Drowsiness or confusion   Redness or drainage at the injection or puncture site   Temperature over 101 degrees F   Other concerns   Worsening back pain   Stiff neck

## 2024-05-07 ENCOUNTER — TELEPHONE (OUTPATIENT)
Dept: INTERVENTIONAL RADIOLOGY/VASCULAR | Facility: HOSPITAL | Age: 69
End: 2024-05-07

## 2024-05-07 NOTE — TELEPHONE ENCOUNTER
INJECTION POST CALL    Procedure: Epidural TF LT C5-6  Radiologist(s): Dr. Sonido Galvan  Date of Procedure:  4/24/24    Relief of pain from this injection    A = 90%  A- = 85%  B = 80%  B- =75%  C = 70%  C- = 65%  D = 60%  D- = 50%  F = less than 50%      Do you feel this injection was beneficial? Somewhat, patient states it took the edge off. For her pain she is 40% relief. But for the weakness in her arm it is at a 0% of relief.    If yes, how long did it last? Patient states that she is still feeling the 40% of relief at the two week f/up    Instruct patient to follow up with their provider for any further care they may need.      Saniya Garland

## 2024-08-30 ENCOUNTER — TELEPHONE (OUTPATIENT)
Dept: CARE COORDINATION | Facility: OTHER | Age: 69
End: 2024-08-30

## 2024-08-30 NOTE — TELEPHONE ENCOUNTER
Attempted to call pt. Pt did not answer at time of call. LVM.     Mallory Wiley RN on 8/30/2024 at 1:56 PM

## 2024-08-30 NOTE — TELEPHONE ENCOUNTER
----- Message from Ernestina HUNT sent at 8/30/2024  1:32 PM CDT -----  Regarding: Dropped a pipe on foot  Jostin Tejada,     I was wondering if you can please triage this pt. She called scheduling saying that she is needing to get in as soon as possible. She dropped a pipe on her foot and now her toes/ toenails hurt so much that she is unable to wear shoes with out pain.       I know we are way overbook next week being the holiday week. Maybe the ER/UC to have them look and make sure nothing is broken?     Pt number is [phone]

## 2024-09-04 NOTE — TELEPHONE ENCOUNTER
"Late entry from 9/3/24:     RN CC spoke with pt in regards to the concerns below. Pt reports \"I hurt my foot a while ago I am just having concerns with the the toe nail. Its not infected but it is just growing weird and I have a hard time trimming it.\" RN CC got pt scheduled to see Dr. Littlejohn.     Mallory Wiley RN on 9/4/2024 at 11:19 AM    "

## 2024-09-19 ENCOUNTER — OFFICE VISIT (OUTPATIENT)
Dept: PODIATRY | Facility: OTHER | Age: 69
End: 2024-09-19
Attending: PODIATRIST
Payer: COMMERCIAL

## 2024-09-19 VITALS
DIASTOLIC BLOOD PRESSURE: 90 MMHG | HEART RATE: 70 BPM | OXYGEN SATURATION: 98 % | SYSTOLIC BLOOD PRESSURE: 146 MMHG | TEMPERATURE: 97.5 F

## 2024-09-19 DIAGNOSIS — R60.0 BILATERAL LOWER EXTREMITY EDEMA: ICD-10-CM

## 2024-09-19 DIAGNOSIS — L60.3 ONYCHODYSTROPHY: Primary | ICD-10-CM

## 2024-09-19 PROCEDURE — 99203 OFFICE O/P NEW LOW 30 MIN: CPT | Mod: 25 | Performed by: PODIATRIST

## 2024-09-19 PROCEDURE — 11750 EXCISION NAIL&NAIL MATRIX: CPT | Mod: TA | Performed by: PODIATRIST

## 2024-09-19 PROCEDURE — G0463 HOSPITAL OUTPT CLINIC VISIT: HCPCS | Mod: 25

## 2024-09-19 NOTE — PATIENT INSTRUCTIONS
Nail procedure care:  -Start epsom salt soaks tomorrow. Soak the foot 1-2 times a day for 20 minutes.  -Apply an antibiotic cream, gauze and a bandaid over the toe for the first week after the procedure.  -After one week, continue the epsom salt soaks, but switch to a betadine dressing. Apply a small amount of betadine on gauze or dab the betadine over the toe with gauze and apply another dry gauze over the toe followed by a band aid or tape.  -Do not apply a band aid directly over the nail procedure site without gauze or it will trap too much moisture beneath the band aid.  Note: Continue the epsom salt soaks and dressings every day until the wound is fully healed. You should not see drainage on the bandage for at least two days in a row. Call the clinic if the wound is still moderately draining two weeks after the procedure.    Keep the toe covered at all times until it is completely healed.  -You may develop a black scab over the nail bed--let this fall off on its own and don't pick at it.  -The toe may drain for 2-3 weeks. It is normal for it to have a clear drainage.    Watching for signs of infection:  If the toe has a thick, white pus coming from the procedure site or if the the toe becomes red, swollen, painful, or you begin to feel sick (fever/chills/nausea/vomitting), return to the podiatry clinic immediately or to the Emergency Department room if after hours.      Podiatry can be reached directly at [phone]. Leave a voicemail if there is not an answer.

## 2024-09-19 NOTE — PROGRESS NOTES
Chief complaint: Patient presents with:  Ingrown Toenail      History of Present Illness: This 69 year old female is seen at the request of No ref. provider found for evaluation and suggestions of management of thickened toenails.     Years ago, she dropped a pipe on her LEFT hallux. The toenail grew thick and she saw a podiatrist who did a nail border matrixectomy. The toenail became worse and has been painful the past few months. The RIGHT hallux medial toenail border also sometimes causes her pain, but the LEFT hallux toenail has been the most painful through the summer. She would like to discuss treatment options.  Additionally, patient says she has struggled with lower extremity edema for years. She has not recently worn compression socks, but she has in the past. She has not been able to fully manage this.    No further pedal complaints today.        BP (!) 146/90 (BP Location: Left arm, Patient Position: Sitting, Cuff Size: Adult Regular)   Pulse 70   Temp 97.5  F (36.4  C) (Tympanic)   SpO2 98%     There is no problem list on file for this patient.      No past surgical history on file.    Current Outpatient Medications   Medication Sig Dispense Refill    albuterol (PROAIR HFA/PROVENTIL HFA/VENTOLIN HFA) 108 (90 Base) MCG/ACT inhaler Inhale 2 puffs into the lungs every 4 hours as needed for shortness of breath or wheezing 18 g 0    benzonatate (TESSALON) 200 MG capsule Take 200 mg by mouth 3 times daily as needed for cough      cefPROZIL (CEFZIL) 500 MG tablet Take 1 tablet by mouth 2 times daily      diclofenac (VOLTAREN) 1 % topical gel Apply 1-2 g topically 3 times daily      folic acid (FOLVITE) 1 MG tablet Take 1 mg by mouth daily      gabapentin (NEURONTIN) 100 MG capsule Take 100 mg by mouth 2 times daily      levothyroxine (SYNTHROID/LEVOTHROID) 50 MCG tablet Take 1 tablet by mouth daily at 2 pm      lidocaine (XYLOCAINE) 5 % external ointment Apply 1 Application topically 4 times daily as needed  for pain      lidocaine (XYLOCAINE) 5 % external ointment Apply 1 Application topically nightly as needed for pain      metoprolol succinate ER (TOPROL XL) 50 MG 24 hr tablet Take 50 mg by mouth daily      predniSONE (DELTASONE) 20 MG tablet Take two tablets (= 40mg) each day for 5 (five) days 10 tablet 0    RINVOQ 15 MG TB24 Take 15 mg by mouth daily      rosuvastatin (CRESTOR) 10 MG tablet Take 20 mg by mouth daily      spacer (OPTICHAMBER MABEL) holding chamber Use with inhaler 1 each 0     No current facility-administered medications for this visit.          Allergies   Allergen Reactions    Atorvastatin Other (See Comments)     Leg pain   Leg pain      Gabapentin     Methotrexate        No family history on file.    Social History     Socioeconomic History    Marital status:      Spouse name: None    Number of children: None    Years of education: None    Highest education level: None   Tobacco Use    Smoking status: Never    Smokeless tobacco: Never     Social Determinants of Health     Interpersonal Safety: Low Risk  (9/19/2024)    Interpersonal Safety     Do you feel physically and emotionally safe where you currently live?: Yes     Within the past 12 months, have you been hit, slapped, kicked or otherwise physically hurt by someone?: No     Within the past 12 months, have you been humiliated or emotionally abused in other ways by your partner or ex-partner?: No       ROS: 10 point ROS neg other than the symptoms noted above in the HPI.  EXAM  Constitutional: healthy, alert, and no distress    Psychiatric: mentation appears normal and affect normal/bright    VASCULAR:  -Dorsalis pedis pulse +2/4 b/l  -Posterior tibial pulse +1/4 b/l  -Capillary refill time < 3 seconds to b/l hallux  -Mild 1+ pitting edema to foot and ankle, bilaterally   NEURO:  -Light touch sensation intact to b/l plantar forefoot  DERM:  -Skin temperature, texture and turgor WNL b/l  -Incurvation to the the bilateral toenail  border of the LEFT hallux  ---Toenail is moderately thickened, discolored and dystrophic  ---Toenail is not attached to the distal 30% of the underlying nail bed  ---No erythema and minimal edema to the nail borders  ---No open wounds and no drainage  ---No severe erythema, no ascending erythema, no calor, no purulence, no malodor, no other SOI.  MSK:  -Pain on palpation to the bilateral toenail border of the LEFT hallux  -Muscle strength of ankles +5/5 for dorsiflexion, plantarflexion, ABDUction and ADDuction b/l    ============================================================    ASSESSMENT:  (L60.3) Onychodystrophy  (primary encounter diagnosis)    (R60.0) Bilateral lower extremity edema        PLAN:  -Patient evaluated and examined. Treatment options discussed with no educational barriers noted.  Ingrown toenail(s):  -Discussed nail procedure options and etiologies and treatments for ingrown toenails. Conservatively, patient could opt for a slant back today and keep monitoring the toe since there are no SOI. Discussed risks and benefits and healing course of a nail border avulsion vs. Matrixectomy including post procedure infection or a non-healing wound, both of which could lead to a severe infection. Patient understands the risks and benefits and has decided to proceed with the following:    -Matrixectomy of left hallux: Written and verbal consent obtained after reviewing risks and benefits of the procedure. Patient understands that although phenol is used in attempt to prevent regrowth of the ingrown toenail, the nail can still grow back. There is also a risk of post procedure infection. A severe foot infection could lead to a proximal foot or leg amputation or loss of life, so the patient is advised to return to podiatry or the ED immediately if the patient notices any SOI. The patient is in agreement with this plan and wishes to proceed with the procedure. A time-out was performed to identify the correct  "patient, limb, digit and procedure.    An alcohol prep pad was applied to  to the base of the left hallux. The digit was injected with 6 mL of 1:1 of 2% Lidocaine plain and 0.5% marcaine plain in a ring block fashion at the base of the toe(s). Adequate local anesthesia was obtained. A ring tournicot was applied to the digit and a chloroprep was applied to the hallux. A freer was used to loosen the nail from the underlying nail bed. An English Anvil and a hemostat were then used to remove the nail in total. A total of three applications of phenol were applied for 30 seconds per application. The digit was rinsed thoroughly with alcohol. The tournicot was removed from the toe and there was a prompt hyperemic response to the hallux. The wound was then dressed with an Silvadene, gauze and 1\" coban. The patient was educated on after procedure care including daily epsom salt soaks starting tomorrow followed by dressing of the toe with an antibiotic cream and a bandaid until the wound site on the toe stops draining (2-3 weeks). Provided education on how to look for signs of infection (redness, swelling, pain, purulence, fever, chills, nausea, vomiting) and the patient was instructed to return to the clinic or Emergency Department immediately if there are any signs of infection.    Lower extremity edema:  -Discussed LOWER EXTREMITY edema including etiology and treatment options.  -Patient has multiple telangiectasias and varicosities. This usually indicates incompetence of the valves in the veins that help push blood flow back to the heart. Blood pools in the legs and causes inflammation.   -Blood pooling can can brown discoloration to the foot, ankle or leg (hemosiderin deposition).  -Chronic LOWER EXTREMITY edema can cause pain or open ulcers.  -Treatment consists of compression socks and elevation of the legs above the level of the heart. Wearing compressions socks during long car rides, plane rides, or when the feet will " be in a dependent position for a long period of time may also moderately reduce the lower extremity edema. Treatment of the inflammation is like wearing glasses in that it's not reversing the problem that is causing the edema, but it is decreasing the inflammation during the time of treatment.  -Patient advised to avoid idle standing and attempt to keep moving when standing. The muscles in the legs act as natural pumps to promote blood flow back to the heart.  -When sitting, attempt to elevate legs above the level of the heart.  -No additional elevation is required when sleeping flat on a bed. Compression socks should also be removed at night when lying down flat on a bed.  -This is an acute, uncomplicated illness/injury with OTC treatment options reviewed.    -Patient in agreement with the above treatment plan and all of patient's questions were answered.      Return to clinic as needed per patient request        Elza Littlejohn DPM

## 2024-10-16 ENCOUNTER — TELEPHONE (OUTPATIENT)
Dept: WOUND CARE | Facility: OTHER | Age: 69
End: 2024-10-16

## 2024-10-16 NOTE — TELEPHONE ENCOUNTER
"Pt returned RN CC call. Pt wondering about dressings and care to toe. Pt reports no redness, purulent drainage, swelling, warmth, fevers but reports \"my toe is draining clear fluid. I have been using a non-adhesive dressing and spray some spray on the toe. I kept the dressing off today and it was draining.\" RN CC asked if pt received her after visit jordan from her appointment and asked if pt was still soaking and doing the betadine dressing. Pt reports \"I am soaking everyday not twice a day. I am not sure what the betadine dressing is.\" RN CC educated pt on the betadine dressing and to not use non-adhesive/bandaid material dressings to the wound bed to always use gauze to cover the wound bed. RN CC educated pt that betadine can be purchased at [facility] or [facility] [employer]. Pt in agreement. RN CC also educated to call clinic if pt develops any increased redness, pain, swelling, pus-like drainage, warmth or any other concerns. Pt in agreement.         Mallory Wiley RN on 10/16/2024 at 4:09 PM    "

## 2024-10-16 NOTE — TELEPHONE ENCOUNTER
RN CC attempted to return pt's call from this afternoon. Pt left a message on machine in regards to questions related to her toe healing and the process. Pt did not answer. RN CC LVM with request to return call.       Mallory Wiley RN on 10/16/2024 at 3:08 PM

## 2024-10-26 ENCOUNTER — HOSPITAL ENCOUNTER (EMERGENCY)
Facility: HOSPITAL | Age: 69
Discharge: HOME | End: 2024-10-26
Attending: NURSE PRACTITIONER | Admitting: NURSE PRACTITIONER
Payer: COMMERCIAL

## 2024-10-26 VITALS
SYSTOLIC BLOOD PRESSURE: 169 MMHG | OXYGEN SATURATION: 100 % | RESPIRATION RATE: 18 BRPM | TEMPERATURE: 97.2 F | DIASTOLIC BLOOD PRESSURE: 75 MMHG | HEART RATE: 74 BPM

## 2024-10-26 DIAGNOSIS — L08.9 TOE INFECTION: Primary | ICD-10-CM

## 2024-10-26 PROCEDURE — 99213 OFFICE O/P EST LOW 20 MIN: CPT | Performed by: NURSE PRACTITIONER

## 2024-10-26 PROCEDURE — G0463 HOSPITAL OUTPT CLINIC VISIT: HCPCS

## 2024-10-26 RX ORDER — SULFAMETHOXAZOLE AND TRIMETHOPRIM 800; 160 MG/1; MG/1
1 TABLET ORAL 2 TIMES DAILY
Qty: 14 TABLET | Refills: 0 | Status: SHIPPED | OUTPATIENT
Start: 2024-10-26 | End: 2024-11-02

## 2024-10-26 ASSESSMENT — ENCOUNTER SYMPTOMS
NAUSEA: 0
PSYCHIATRIC NEGATIVE: 1
CHILLS: 0
VOMITING: 0
SHORTNESS OF BREATH: 0
WOUND: 1
DIARRHEA: 0
FEVER: 0

## 2024-10-26 ASSESSMENT — ACTIVITIES OF DAILY LIVING (ADL): ADLS_ACUITY_SCORE: 0

## 2024-10-26 ASSESSMENT — COLUMBIA-SUICIDE SEVERITY RATING SCALE - C-SSRS
2. HAVE YOU ACTUALLY HAD ANY THOUGHTS OF KILLING YOURSELF IN THE PAST MONTH?: NO
1. IN THE PAST MONTH, HAVE YOU WISHED YOU WERE DEAD OR WISHED YOU COULD GO TO SLEEP AND NOT WAKE UP?: NO
6. HAVE YOU EVER DONE ANYTHING, STARTED TO DO ANYTHING, OR PREPARED TO DO ANYTHING TO END YOUR LIFE?: NO

## 2024-10-26 NOTE — ED PROVIDER NOTES
History     Chief Complaint   Patient presents with    Toe Pain     HPI  Marcelina Cuellar is a 69 year old female who presents to urgent care today ambulatory with complaints of purulent drainage to left great toe.  Patient had matrixectomy of left hallux completed on 9/19/2024.  Purulent drainage started yesterday.  Full ROM.  Denies any fall, injury or trauma.  Has been changing dressings daily.  Denies any fever, chills, nausea, vomiting, diarrhea, shortness of breath or chest pain.  No other concerns.    Allergies:  Allergies   Allergen Reactions    Atorvastatin Other (See Comments)     Leg pain   Leg pain      Ezetimibe Other (See Comments)     Chest tightness    Gabapentin     Methotrexate        Problem List:    There are no active problems to display for this patient.       Past Medical History:    No past medical history on file.    Past Surgical History:    No past surgical history on file.    Family History:    No family history on file.    Social History:  Marital Status:   [2]  Social History     Tobacco Use    Smoking status: Never    Smokeless tobacco: Never        Medications:    sulfamethoxazole-trimethoprim (BACTRIM DS) 800-160 MG tablet  albuterol (PROAIR HFA/PROVENTIL HFA/VENTOLIN HFA) 108 (90 Base) MCG/ACT inhaler  benzonatate (TESSALON) 200 MG capsule  cefPROZIL (CEFZIL) 500 MG tablet  diclofenac (VOLTAREN) 1 % topical gel  folic acid (FOLVITE) 1 MG tablet  gabapentin (NEURONTIN) 100 MG capsule  levothyroxine (SYNTHROID/LEVOTHROID) 50 MCG tablet  lidocaine (XYLOCAINE) 5 % external ointment  lidocaine (XYLOCAINE) 5 % external ointment  metoprolol succinate ER (TOPROL XL) 50 MG 24 hr tablet  predniSONE (DELTASONE) 20 MG tablet  RINVOQ 15 MG TB24  rosuvastatin (CRESTOR) 10 MG tablet  spacer (OPTICHAMBER MABEL) holding chamber      Review of Systems   Constitutional:  Negative for chills and fever.   Respiratory:  Negative for shortness of breath.    Cardiovascular:  Negative for chest  pain.   Gastrointestinal:  Negative for diarrhea, nausea and vomiting.   Musculoskeletal:  Negative for gait problem.   Skin:  Positive for wound (left great toe).   Psychiatric/Behavioral: Negative.       Physical Exam   BP: 169/75  Pulse: 74  Temp: 97.2  F (36.2  C)  Resp: 18  SpO2: 100 %    Physical Exam  Vitals and nursing note reviewed.   Constitutional:       General: She is not in acute distress.     Appearance: Normal appearance. She is not ill-appearing or toxic-appearing.   Cardiovascular:      Rate and Rhythm: Normal rate and regular rhythm.      Pulses: Normal pulses.      Heart sounds: Normal heart sounds.   Pulmonary:      Effort: Pulmonary effort is normal.      Breath sounds: Normal breath sounds.   Musculoskeletal:      Left foot: Normal range of motion. Tenderness present. No swelling, deformity, laceration or crepitus. Normal pulse.      Comments: Prior matrixectomy of left hallux completed on 9/19/2024 resulting in total removal of nail.  Small amount of purulent drainage to nail bed with mild surrounding erythema.  Full ROM.     Skin:     General: Skin is warm and dry.   Neurological:      Mental Status: She is alert.   Psychiatric:         Mood and Affect: Mood normal.       ED Course     Procedures      No results found for this or any previous visit (from the past 24 hours).    Medications - No data to display    Assessments & Plan (with Medical Decision Making)     I have reviewed the nursing notes.    I have reviewed the findings, diagnosis, plan and need for follow up with the patient.  (L08.9) Toe infection  (primary encounter diagnosis)  Plan:   Patient ambulatory with a nontoxic appearance.  Prior matrixectomy of left hallux completed on 9/19/2024 resulting in total removal of nail.  Small amount of purulent drainage to nail bed with mild surrounding erythema.  Full ROM.  Denies any fever, chills, nausea, vomiting, diarrhea, shortness of breath or chest pain.  Will start patient on  Bactrim.  Close follow-up with podiatry early next week.  Continue to change dressings as previously ordered.  Follow-up with primary care provider or return to urgent care/ED with any worsening in condition or additional concerns.  Patient in agreement treatment plan.    Discharge Medication List as of 10/26/2024 12:08 PM        START taking these medications    Details   sulfamethoxazole-trimethoprim (BACTRIM DS) 800-160 MG tablet Take 1 tablet by mouth 2 times daily for 7 days., Disp-14 tablet, R-0, E-Prescribe           Final diagnoses:   Toe infection     10/26/2024   [facility]       Ciera Springer, MAX  10/26/24 1222

## 2024-10-26 NOTE — DISCHARGE INSTRUCTIONS
Bactrim as ordered  - Take entire course of antibiotic even if you start to feel better.  - Antibiotics can cause stomach upset including nausea and diarrhea. Read your bottle or ask the pharmacist if antibiotic can be taken with food to help prevent nausea. If you have symptoms of diarrhea you can take an over-the-counter probiotic and/or increase foods with probiotics such as yogurt, Brandon, sauerkraut.    Follow up with podiatry early next week for further evaluation    Follow-up with primary care provider or return to urgent care/ED with any worsening in condition or additional concerns.

## 2024-10-26 NOTE — ED TRIAGE NOTES
Pt presents with c/o having increased purulent drainage from the left big toe   Reports had whole toenail removed last month and wants to make sure its not infected due to having surgery up coming for hip   S/x were noticed a week ago   No otc meds taken today